# Patient Record
(demographics unavailable — no encounter records)

---

## 2025-07-02 NOTE — ASSESSMENT
[FreeTextEntry1] : bilateral AOM and bilateral bacterial conjunctivitis; switch Amoxicillin to Augmentin.  will add on polymyxin eye drops. advised to return to office in 7 days to recheck ear infection

## 2025-07-02 NOTE — REVIEW OF SYSTEMS
[Discharge] : discharge [Earache] : earache [Nasal Discharge] : nasal discharge [Sore Throat] : sore throat [Postnasal Drip] : postnasal drip [Cough] : cough [Shortness Of Breath] : no shortness of breath [Wheezing] : no wheezing [Dyspnea on Exertion] : no dyspnea on exertion

## 2025-07-02 NOTE — HISTORY OF PRESENT ILLNESS
[FreeTextEntry8] : 33 YOF with cold like symptoms x 1 week that turned into double ear infection. She went to [facility] 6/30 and was given AMoxicilin 875 mg BID x 7 days. Now has complaints of bilateral pink eye.

## 2025-07-02 NOTE — PHYSICAL EXAM
[No Acute Distress] : no acute distress [Ill-Appearing] : ill-appearing [Normal] : normal rate, regular rhythm, normal S1 and S2 and no murmur heard [de-identified] : mild erythema bilateral eyes.  [de-identified] : Left otitis mild erythema. right otitis severe erythema

## 2025-07-09 NOTE — ASSESSMENT
[FreeTextEntry1] : Bilateral AOM - s/p 10 days of Augmentin. Left AOM is resolved. Right AOM approx 75% improved. Will add Ciprodex Otic drops and Medrol dose pack. Will refer to ENT for second opinion

## 2025-07-09 NOTE — HISTORY OF PRESENT ILLNESS
[FreeTextEntry1] : Patient presents for follow up from previous visit   [de-identified] : TARIK ARANDA is a 33 year female who presents today Jul 09, 2025 to follow up on bilateral AOM  7/9: finished Augmentin. Right ear still feels clogged.   7/2: cold like symptoms x 1 week that turned into double ear infection. She went to [facility] 6/30 and was given Amoxicillin 875 mg BID x 7 days. Now has complaints of bilateral pink eye. I switched her from Amoxicilin to Augmentin.

## 2025-07-09 NOTE — PHYSICAL EXAM
[Coordination Grossly Intact] : coordination grossly intact [No Focal Deficits] : no focal deficits [Normal Gait] : normal gait [Normal] : affect was normal and insight and judgment were intact [de-identified] : Left AOM resolved. Right AOM 75% improved